# Patient Record
Sex: FEMALE | Race: WHITE | NOT HISPANIC OR LATINO | Employment: FULL TIME | URBAN - METROPOLITAN AREA
[De-identification: names, ages, dates, MRNs, and addresses within clinical notes are randomized per-mention and may not be internally consistent; named-entity substitution may affect disease eponyms.]

---

## 2024-03-21 ENCOUNTER — OFFICE VISIT (OUTPATIENT)
Dept: OBSTETRICS AND GYNECOLOGY | Facility: CLINIC | Age: 24
End: 2024-03-21
Payer: COMMERCIAL

## 2024-03-21 VITALS
DIASTOLIC BLOOD PRESSURE: 66 MMHG | SYSTOLIC BLOOD PRESSURE: 110 MMHG | WEIGHT: 163.25 LBS | BODY MASS INDEX: 30.85 KG/M2

## 2024-03-21 DIAGNOSIS — N39.0 FREQUENT UTI: ICD-10-CM

## 2024-03-21 DIAGNOSIS — Z12.4 PAP SMEAR FOR CERVICAL CANCER SCREENING: Primary | ICD-10-CM

## 2024-03-21 PROCEDURE — 87800 DETECT AGNT MULT DNA DIREC: CPT

## 2024-03-21 PROCEDURE — 87661 TRICHOMONAS VAGINALIS AMPLIF: CPT

## 2024-03-21 PROCEDURE — 88175 CYTOPATH C/V AUTO FLUID REDO: CPT

## 2024-03-21 PROCEDURE — 88141 CYTOPATH C/V INTERPRET: CPT | Performed by: PATHOLOGY

## 2024-03-21 PROCEDURE — 99213 OFFICE O/P EST LOW 20 MIN: CPT

## 2024-03-21 RX ORDER — CIPROFLOXACIN 500 MG/1
500 TABLET ORAL 2 TIMES DAILY
COMMUNITY
Start: 2023-05-19 | End: 2023-05-26

## 2024-03-21 ASSESSMENT — ENCOUNTER SYMPTOMS
LOSS OF SENSATION IN FEET: 0
OCCASIONAL FEELINGS OF UNSTEADINESS: 0
DEPRESSION: 0

## 2024-03-21 ASSESSMENT — PATIENT HEALTH QUESTIONNAIRE - PHQ9
SUM OF ALL RESPONSES TO PHQ9 QUESTIONS 1 AND 2: 0
2. FEELING DOWN, DEPRESSED OR HOPELESS: NOT AT ALL
1. LITTLE INTEREST OR PLEASURE IN DOING THINGS: NOT AT ALL

## 2024-03-21 NOTE — PROGRESS NOTES
Subjective   Patient ID: Vidal Rosales is a 23 y.o. female who presents for Procedure (Mirena IUD removal. Placed 4/18/2019./Taking Cipro for UTI currently.).    HPI  Patient presents to the office today for Mirena IUD removal.  States that it was inserted in 2019 and she is nearing her expiration date.  Engaged to be ; wedding next year in October.  Not having any periods or bleeding currently with IUD.  Considering beginning birth control pills.  Concern for frequent UTI, states that she believes that she gets them once/month.  On Cipro for UTI currently.    Last pap 3 years ago, LSIL.    Review of Systems   All other systems reviewed and are negative.    Objective   Physical Exam  Constitutional:       Appearance: Normal appearance.   HENT:      Head: Normocephalic.   Pulmonary:      Effort: Pulmonary effort is normal.   Genitourinary:     General: Normal vulva.      Vagina: Normal.      Cervix: Normal.      Comments: IUD strings visualized.  Skin:     General: Skin is warm and dry.   Neurological:      Mental Status: She is alert and oriented to person, place, and time.   Psychiatric:         Mood and Affect: Mood normal.         Assessment/Plan   Diagnoses and all orders for this visit:  Frequent UTI  -     Referral to Urogynecology; Future  Pap smear for cervical cancer screening  -     THINPREP PAP TEST    Discussed with patient that Mirena IUD is approved for up to 8 years -- Patient states that she would like to LEAVE Mirena in place -- Does NOT desire removal.  Exp. 2027.     Due for pap; obtained today.  If normal, will repeat in 1 year.    Referral to Uro/GYN for frequent UTIs.    All questions answered at this time.    F/U as needed.    NEFTALY Meyer 03/21/24 3:50 PM

## 2024-03-26 LAB
C TRACH RRNA SPEC QL NAA+PROBE: NEGATIVE
N GONORRHOEA DNA SPEC QL PROBE+SIG AMP: NEGATIVE
T VAGINALIS RRNA SPEC QL NAA+PROBE: NEGATIVE

## 2024-04-03 LAB
CYTOLOGY CMNT CVX/VAG CYTO-IMP: NORMAL
LAB AP HPV GENOTYPE QUESTION: NO
LAB AP HPV HR: NORMAL
LAB AP PAP ADDITIONAL TESTS: NORMAL
LABORATORY COMMENT REPORT: NORMAL
PATH REPORT.TOTAL CANCER: NORMAL

## 2024-04-29 ENCOUNTER — PROCEDURE VISIT (OUTPATIENT)
Dept: OBSTETRICS AND GYNECOLOGY | Facility: CLINIC | Age: 24
End: 2024-04-29
Payer: COMMERCIAL

## 2024-04-29 VITALS — WEIGHT: 160.4 LBS | BODY MASS INDEX: 30.31 KG/M2 | SYSTOLIC BLOOD PRESSURE: 122 MMHG | DIASTOLIC BLOOD PRESSURE: 64 MMHG

## 2024-04-29 DIAGNOSIS — R87.612 LGSIL ON PAP SMEAR OF CERVIX: ICD-10-CM

## 2024-04-29 DIAGNOSIS — Z30.430 ENCOUNTER FOR INSERTION OF MIRENA IUD: ICD-10-CM

## 2024-04-29 LAB — PREGNANCY TEST URINE, POC: NEGATIVE

## 2024-04-29 PROCEDURE — 88305 TISSUE EXAM BY PATHOLOGIST: CPT

## 2024-04-29 PROCEDURE — 57454 BX/CURETT OF CERVIX W/SCOPE: CPT | Performed by: ADVANCED PRACTICE MIDWIFE

## 2024-04-29 PROCEDURE — 81025 URINE PREGNANCY TEST: CPT | Performed by: ADVANCED PRACTICE MIDWIFE

## 2024-04-29 PROCEDURE — 88305 TISSUE EXAM BY PATHOLOGIST: CPT | Performed by: STUDENT IN AN ORGANIZED HEALTH CARE EDUCATION/TRAINING PROGRAM

## 2024-04-29 RX ORDER — LEVONORGESTREL 52 MG/1
1 INTRAUTERINE DEVICE INTRAUTERINE ONCE
COMMUNITY
Start: 2019-10-15

## 2024-04-29 NOTE — PROGRESS NOTES
Patient ID: Vidal Rosales is a 23 y.o. female.    Colposcopy    Date/Time: 4/29/2024 9:48 AM    Performed by: NEFTALY Diaz  Authorized by: NEFTALY Diaz    Procedure location: cervix and vagina    Consent:     Patient questions answered: yes      Risks and benefits of the procedure and its alternatives discussed: yes      Procedural risks discussed:  Bleeding and infection    Consent obtained:  Verbal and written    Consent given by:  Patient  Indication:     Cervical indication(s): cervical LSIL    Pre-procedure:     Speculum was placed in the vagina: yes      Prep solution(s): acetic acid      Local anesthetic:  Benzocaine spray  Procedure:     Colposcopy with: cervical biopsy and endocervical curettage      Biopsy taken: yes      # of biopsies:  2    Biopsy location(s): 5 & 12 o'clock    Cervix visibility: fully visualized      SCJ visibility: fully visualized      Lesion visualized: fully visualized      Acetowhite lesion(s): cervix      Cervical impression: low grade      Vaginal impression: normal/benign      Ferric subsulfate solution applied: yes      Tampon inserted: no    Post-procedure:     Patient tolerance of procedure:  Tolerated with difficulty    Instructions and paperwork completed: yes      Educational handouts given: yes    Comments:      Mirena IUD accidentally removed during procedure. Consent for a new Mirena IUD was obtained and a new IUD was placed after the colposcopy.

## 2024-05-03 ENCOUNTER — TELEPHONE (OUTPATIENT)
Dept: OBSTETRICS AND GYNECOLOGY | Facility: CLINIC | Age: 24
End: 2024-05-03

## 2024-05-03 LAB
LABORATORY COMMENT REPORT: NORMAL
PATH REPORT.FINAL DX SPEC: NORMAL
PATH REPORT.GROSS SPEC: NORMAL
PATH REPORT.RELEVANT HX SPEC: NORMAL
PATH REPORT.TOTAL CANCER: NORMAL

## 2024-05-03 NOTE — TELEPHONE ENCOUNTER
Patient states that she has a vaginal odor now after having the procedure done. Patient also states that she is having brown watery discharge and I advised her that, that could be old blood. Patient pharmacy is up to date if you can send an antibiotic for her vaginal odor.